# Patient Record
Sex: FEMALE | Race: WHITE | Employment: FULL TIME | URBAN - METROPOLITAN AREA
[De-identification: names, ages, dates, MRNs, and addresses within clinical notes are randomized per-mention and may not be internally consistent; named-entity substitution may affect disease eponyms.]

---

## 2020-12-31 ENCOUNTER — HOSPITAL ENCOUNTER (OUTPATIENT)
Dept: SURGERY | Age: 45
Discharge: HOME | End: 2020-12-31
Payer: COMMERCIAL

## 2020-12-31 ENCOUNTER — HOSPITAL ENCOUNTER (OUTPATIENT)
Dept: LAB | Age: 45
Discharge: HOME | End: 2020-12-31

## 2020-12-31 VITALS
OXYGEN SATURATION: 98 % | RESPIRATION RATE: 17 BRPM | TEMPERATURE: 98.1 F | DIASTOLIC BLOOD PRESSURE: 75 MMHG | HEART RATE: 72 BPM | WEIGHT: 136 LBS | SYSTOLIC BLOOD PRESSURE: 125 MMHG | BODY MASS INDEX: 20.14 KG/M2 | HEIGHT: 69 IN

## 2020-12-31 LAB
BACTERIA SPEC CULT: NORMAL
SERVICE CMNT-IMP: NORMAL

## 2020-12-31 PROCEDURE — 87641 MR-STAPH DNA AMP PROBE: CPT

## 2020-12-31 RX ORDER — ACETAMINOPHEN 325 MG/1
TABLET ORAL
COMMUNITY

## 2020-12-31 RX ORDER — NAPROXEN 375 MG/1
375 TABLET ORAL 2 TIMES DAILY WITH MEALS
COMMUNITY

## 2020-12-31 NOTE — PERIOP NOTES
Patient verified name, , and surgery as listed in [facility]. Patient provided medical/health information and PTA medications to the best of their ability. TYPE  CASE: 1B  Orders per surgeon: were not found during PAT  Labs per surgeon: MRSA. Labs per anesthesia protocol: none per protocol. EKG:  None per protocol    Surgery labeled Urgent, no covid test per hospital policy. Nasal Swab collected per MD order. Patient provided with and instructed on education handouts including Guide to Surgery, blood transfusions, pain management, and hand hygiene for the family and community, and [facility] brochure. Road to Recovery Spine surgery patient guide given. Instructed on incentive spirometry with return demonstration. Hibiclens and instructions given per hospital policy. Original medication prescription bottles were not visualized during patient appointment. Patient teach back successful and patient demonstrates knowledge of instruction.

## 2020-12-31 NOTE — PERIOP NOTES
PLEASE CONTINUE TAKING ALL PRESCRIPTION MEDICATIONS UP TO THE DAY OF SURGERY UNLESS OTHERWISE DIRECTED BELOW. DISCONTINUE all vitamins and supplements 5 days prior to surgery. DISCONTINUE Non-Steriodal Anti-Inflammatory (NSAIDS) such as Advil and Aleve 5 days prior to surgery. Home Medications to take  the day of surgery    Oxycodone-acetaminophen (Percoet)           Home Medications   to Hold   Naprosyn (Alleve) 5 days prior to surgery        Comments          *Visitor policy of 1 visitor per patient discussed. Please do not bring home medications with you on the day of surgery unless otherwise directed by your nurse. If you are instructed to bring home medications, please give them to your nurse as they will be administered by the nursing staff. If you have any questions, please call [address] [place] [address] [phone] or [facility] [phone]. A copy of this note was provided to the patient for reference.

## 2020-12-31 NOTE — PERIOP NOTES
Recent Results (from the past 12 hour(s))   MSSA/MRSA SC BY PCR, NASAL SWAB    Collection Time: 12/31/20 10:39 AM    Specimen: Nasal swab   Result Value Ref Range    Special Requests: NO SPECIAL REQUESTS      Culture result:        SA target not detected. A MRSA NEGATIVE, SA NEGATIVE test result does not preclude MRSA or SA nasal colonization. Labs routed to Dr. Bryce Guerra.

## 2021-01-03 ENCOUNTER — ANESTHESIA EVENT (OUTPATIENT)
Dept: SURGERY | Age: 46
End: 2021-01-03
Payer: OTHER MISCELLANEOUS

## 2021-01-04 ENCOUNTER — ANESTHESIA (OUTPATIENT)
Dept: SURGERY | Age: 46
End: 2021-01-04
Payer: OTHER MISCELLANEOUS

## 2021-01-04 ENCOUNTER — HOSPITAL ENCOUNTER (OUTPATIENT)
Age: 46
Setting detail: OUTPATIENT SURGERY
Discharge: HOME | End: 2021-01-04
Attending: NEUROLOGICAL SURGERY | Admitting: NEUROLOGICAL SURGERY
Payer: OTHER MISCELLANEOUS

## 2021-01-04 ENCOUNTER — APPOINTMENT (OUTPATIENT)
Dept: GENERAL RADIOLOGY | Age: 46
End: 2021-01-04
Attending: NEUROLOGICAL SURGERY
Payer: OTHER MISCELLANEOUS

## 2021-01-04 VITALS
HEIGHT: 69 IN | WEIGHT: 135 LBS | OXYGEN SATURATION: 100 % | SYSTOLIC BLOOD PRESSURE: 160 MMHG | TEMPERATURE: 98.4 F | DIASTOLIC BLOOD PRESSURE: 67 MMHG | BODY MASS INDEX: 19.99 KG/M2 | RESPIRATION RATE: 16 BRPM | HEART RATE: 73 BPM

## 2021-01-04 DIAGNOSIS — M48.062 SPINAL STENOSIS OF LUMBAR REGION WITH NEUROGENIC CLAUDICATION: Primary | ICD-10-CM

## 2021-01-04 PROCEDURE — 76210000021 HC REC RM PH II 0.5 TO 1 HR: Performed by: NEUROLOGICAL SURGERY

## 2021-01-04 PROCEDURE — 77030040922 HC BLNKT HYPOTHRM STRY -A: Performed by: STUDENT IN AN ORGANIZED HEALTH CARE EDUCATION/TRAINING PROGRAM

## 2021-01-04 PROCEDURE — 74011250637 HC RX REV CODE- 250/637: Performed by: STUDENT IN AN ORGANIZED HEALTH CARE EDUCATION/TRAINING PROGRAM

## 2021-01-04 PROCEDURE — 77030040106 HC FCPS BIPOLAR DISP INLC -D: Performed by: NEUROLOGICAL SURGERY

## 2021-01-04 PROCEDURE — 77030018390 HC SPNG HEMSTAT2 J&J -B: Performed by: NEUROLOGICAL SURGERY

## 2021-01-04 PROCEDURE — 74011000272 HC RX REV CODE- 272: Performed by: NEUROLOGICAL SURGERY

## 2021-01-04 PROCEDURE — 76010000161 HC OR TIME 1 TO 1.5 HR INTENSV-TIER 1: Performed by: NEUROLOGICAL SURGERY

## 2021-01-04 PROCEDURE — 88304 TISSUE EXAM BY PATHOLOGIST: CPT

## 2021-01-04 PROCEDURE — 77030034479 HC ADH SKN CLSR PRINEO J&J -B: Performed by: NEUROLOGICAL SURGERY

## 2021-01-04 PROCEDURE — 77030040361 HC SLV COMPR DVT MDII -B: Performed by: NEUROLOGICAL SURGERY

## 2021-01-04 PROCEDURE — 74011250636 HC RX REV CODE- 250/636: Performed by: NEUROLOGICAL SURGERY

## 2021-01-04 PROCEDURE — 74011000250 HC RX REV CODE- 250: Performed by: NEUROLOGICAL SURGERY

## 2021-01-04 PROCEDURE — 77030030163 HC BN WAX J&J -A: Performed by: NEUROLOGICAL SURGERY

## 2021-01-04 PROCEDURE — 77030019908 HC STETH ESOPH SIMS -A: Performed by: STUDENT IN AN ORGANIZED HEALTH CARE EDUCATION/TRAINING PROGRAM

## 2021-01-04 PROCEDURE — 72020 X-RAY EXAM OF SPINE 1 VIEW: CPT

## 2021-01-04 PROCEDURE — 74011250636 HC RX REV CODE- 250/636: Performed by: NURSE ANESTHETIST, CERTIFIED REGISTERED

## 2021-01-04 PROCEDURE — 74011000250 HC RX REV CODE- 250: Performed by: NURSE ANESTHETIST, CERTIFIED REGISTERED

## 2021-01-04 PROCEDURE — 77030012935 HC DRSG AQUACEL BMS -B: Performed by: NEUROLOGICAL SURGERY

## 2021-01-04 PROCEDURE — 74011250637 HC RX REV CODE- 250/637: Performed by: NEUROLOGICAL SURGERY

## 2021-01-04 PROCEDURE — 74011250636 HC RX REV CODE- 250/636: Performed by: STUDENT IN AN ORGANIZED HEALTH CARE EDUCATION/TRAINING PROGRAM

## 2021-01-04 PROCEDURE — 77030037088 HC TUBE ENDOTRACH ORAL NSL COVD-A: Performed by: STUDENT IN AN ORGANIZED HEALTH CARE EDUCATION/TRAINING PROGRAM

## 2021-01-04 PROCEDURE — 76210000063 HC OR PH I REC FIRST 0.5 HR: Performed by: NEUROLOGICAL SURGERY

## 2021-01-04 PROCEDURE — 77030039425 HC BLD LARYNG TRULITE DISP TELE -A: Performed by: STUDENT IN AN ORGANIZED HEALTH CARE EDUCATION/TRAINING PROGRAM

## 2021-01-04 PROCEDURE — 77030012894: Performed by: NEUROLOGICAL SURGERY

## 2021-01-04 PROCEDURE — 77030003029 HC SUT VCRL J&J -B: Performed by: NEUROLOGICAL SURGERY

## 2021-01-04 PROCEDURE — 76060000033 HC ANESTHESIA 1 TO 1.5 HR: Performed by: NEUROLOGICAL SURGERY

## 2021-01-04 PROCEDURE — 2709999900 HC NON-CHARGEABLE SUPPLY: Performed by: NEUROLOGICAL SURGERY

## 2021-01-04 RX ORDER — HYDROMORPHONE HYDROCHLORIDE 2 MG/ML
0.5 INJECTION, SOLUTION INTRAMUSCULAR; INTRAVENOUS; SUBCUTANEOUS
Status: DISCONTINUED | OUTPATIENT
Start: 2021-01-04 | End: 2021-01-04 | Stop reason: HOSPADM

## 2021-01-04 RX ORDER — FENTANYL CITRATE 50 UG/ML
INJECTION, SOLUTION INTRAMUSCULAR; INTRAVENOUS AS NEEDED
Status: DISCONTINUED | OUTPATIENT
Start: 2021-01-04 | End: 2021-01-04 | Stop reason: HOSPADM

## 2021-01-04 RX ORDER — BUPIVACAINE HYDROCHLORIDE AND EPINEPHRINE 5; 5 MG/ML; UG/ML
INJECTION, SOLUTION EPIDURAL; INTRACAUDAL; PERINEURAL AS NEEDED
Status: DISCONTINUED | OUTPATIENT
Start: 2021-01-04 | End: 2021-01-04 | Stop reason: HOSPADM

## 2021-01-04 RX ORDER — ESMOLOL HYDROCHLORIDE 10 MG/ML
INJECTION INTRAVENOUS AS NEEDED
Status: DISCONTINUED | OUTPATIENT
Start: 2021-01-04 | End: 2021-01-04 | Stop reason: HOSPADM

## 2021-01-04 RX ORDER — CEFAZOLIN SODIUM/WATER 2 G/20 ML
2 SYRINGE (ML) INTRAVENOUS ONCE
Status: DISCONTINUED | OUTPATIENT
Start: 2021-01-04 | End: 2021-01-04 | Stop reason: HOSPADM

## 2021-01-04 RX ORDER — DEXAMETHASONE SODIUM PHOSPHATE 4 MG/ML
INJECTION, SOLUTION INTRA-ARTICULAR; INTRALESIONAL; INTRAMUSCULAR; INTRAVENOUS; SOFT TISSUE AS NEEDED
Status: DISCONTINUED | OUTPATIENT
Start: 2021-01-04 | End: 2021-01-04 | Stop reason: HOSPADM

## 2021-01-04 RX ORDER — OXYCODONE HYDROCHLORIDE 5 MG/1
5 TABLET ORAL
Status: COMPLETED | OUTPATIENT
Start: 2021-01-04 | End: 2021-01-04

## 2021-01-04 RX ORDER — ROCURONIUM BROMIDE 10 MG/ML
INJECTION, SOLUTION INTRAVENOUS AS NEEDED
Status: DISCONTINUED | OUTPATIENT
Start: 2021-01-04 | End: 2021-01-04 | Stop reason: HOSPADM

## 2021-01-04 RX ORDER — MIDAZOLAM HYDROCHLORIDE 1 MG/ML
2 INJECTION, SOLUTION INTRAMUSCULAR; INTRAVENOUS
Status: DISCONTINUED | OUTPATIENT
Start: 2021-01-04 | End: 2021-01-04 | Stop reason: HOSPADM

## 2021-01-04 RX ORDER — SODIUM CHLORIDE, SODIUM LACTATE, POTASSIUM CHLORIDE, CALCIUM CHLORIDE 600; 310; 30; 20 MG/100ML; MG/100ML; MG/100ML; MG/100ML
100 INJECTION, SOLUTION INTRAVENOUS CONTINUOUS
Status: DISCONTINUED | OUTPATIENT
Start: 2021-01-04 | End: 2021-01-04 | Stop reason: HOSPADM

## 2021-01-04 RX ORDER — PROPOFOL 10 MG/ML
INJECTION, EMULSION INTRAVENOUS AS NEEDED
Status: DISCONTINUED | OUTPATIENT
Start: 2021-01-04 | End: 2021-01-04 | Stop reason: HOSPADM

## 2021-01-04 RX ORDER — NALOXONE HYDROCHLORIDE 0.4 MG/ML
0.1 INJECTION, SOLUTION INTRAMUSCULAR; INTRAVENOUS; SUBCUTANEOUS AS NEEDED
Status: DISCONTINUED | OUTPATIENT
Start: 2021-01-04 | End: 2021-01-04 | Stop reason: HOSPADM

## 2021-01-04 RX ORDER — SODIUM CHLORIDE 0.9 % (FLUSH) 0.9 %
5-40 SYRINGE (ML) INJECTION AS NEEDED
Status: DISCONTINUED | OUTPATIENT
Start: 2021-01-04 | End: 2021-01-04 | Stop reason: HOSPADM

## 2021-01-04 RX ORDER — APREPITANT 40 MG/1
40 CAPSULE ORAL ONCE
Status: COMPLETED | OUTPATIENT
Start: 2021-01-04 | End: 2021-01-04

## 2021-01-04 RX ORDER — GLYCOPYRROLATE 0.2 MG/ML
INJECTION INTRAMUSCULAR; INTRAVENOUS AS NEEDED
Status: DISCONTINUED | OUTPATIENT
Start: 2021-01-04 | End: 2021-01-04 | Stop reason: HOSPADM

## 2021-01-04 RX ORDER — SODIUM CHLORIDE 0.9 % (FLUSH) 0.9 %
5-40 SYRINGE (ML) INJECTION EVERY 8 HOURS
Status: DISCONTINUED | OUTPATIENT
Start: 2021-01-04 | End: 2021-01-04 | Stop reason: HOSPADM

## 2021-01-04 RX ORDER — DIPHENHYDRAMINE HYDROCHLORIDE 50 MG/ML
12.5 INJECTION, SOLUTION INTRAMUSCULAR; INTRAVENOUS
Status: DISCONTINUED | OUTPATIENT
Start: 2021-01-04 | End: 2021-01-04 | Stop reason: HOSPADM

## 2021-01-04 RX ORDER — FLUMAZENIL 0.1 MG/ML
0.2 INJECTION INTRAVENOUS
Status: DISCONTINUED | OUTPATIENT
Start: 2021-01-04 | End: 2021-01-04 | Stop reason: HOSPADM

## 2021-01-04 RX ORDER — ACETAMINOPHEN 500 MG
1000 TABLET ORAL ONCE
Status: COMPLETED | OUTPATIENT
Start: 2021-01-04 | End: 2021-01-04

## 2021-01-04 RX ORDER — LIDOCAINE HYDROCHLORIDE 20 MG/ML
INJECTION, SOLUTION EPIDURAL; INFILTRATION; INTRACAUDAL; PERINEURAL AS NEEDED
Status: DISCONTINUED | OUTPATIENT
Start: 2021-01-04 | End: 2021-01-04 | Stop reason: HOSPADM

## 2021-01-04 RX ORDER — OXYCODONE AND ACETAMINOPHEN 10; 325 MG/1; MG/1
1 TABLET ORAL
Qty: 28 TAB | Refills: 0 | Status: SHIPPED | OUTPATIENT
Start: 2021-01-04 | End: 2021-01-11

## 2021-01-04 RX ORDER — LIDOCAINE HYDROCHLORIDE 10 MG/ML
0.1 INJECTION INFILTRATION; PERINEURAL AS NEEDED
Status: DISCONTINUED | OUTPATIENT
Start: 2021-01-04 | End: 2021-01-04 | Stop reason: HOSPADM

## 2021-01-04 RX ORDER — ONDANSETRON 2 MG/ML
4 INJECTION INTRAMUSCULAR; INTRAVENOUS ONCE
Status: COMPLETED | OUTPATIENT
Start: 2021-01-04 | End: 2021-01-04

## 2021-01-04 RX ORDER — NEOSTIGMINE METHYLSULFATE 1 MG/ML
INJECTION, SOLUTION INTRAVENOUS AS NEEDED
Status: DISCONTINUED | OUTPATIENT
Start: 2021-01-04 | End: 2021-01-04 | Stop reason: HOSPADM

## 2021-01-04 RX ORDER — THROMBIN, TOPICAL (BOVINE) 20000 UNIT
KIT TOPICAL AS NEEDED
Status: DISCONTINUED | OUTPATIENT
Start: 2021-01-04 | End: 2021-01-04 | Stop reason: HOSPADM

## 2021-01-04 RX ADMIN — FENTANYL CITRATE 50 MCG: 50 INJECTION INTRAMUSCULAR; INTRAVENOUS at 10:24

## 2021-01-04 RX ADMIN — APREPITANT 40 MG: 40 CAPSULE ORAL at 08:40

## 2021-01-04 RX ADMIN — Medication 3 MG: at 11:26

## 2021-01-04 RX ADMIN — FENTANYL CITRATE 50 MCG: 50 INJECTION INTRAMUSCULAR; INTRAVENOUS at 11:36

## 2021-01-04 RX ADMIN — FENTANYL CITRATE 50 MCG: 50 INJECTION INTRAMUSCULAR; INTRAVENOUS at 10:27

## 2021-01-04 RX ADMIN — ESMOLOL HYDROCHLORIDE 30 MG: 10 INJECTION, SOLUTION INTRAVENOUS at 11:31

## 2021-01-04 RX ADMIN — GLYCOPYRROLATE 0.6 MG: 0.2 INJECTION, SOLUTION INTRAMUSCULAR; INTRAVENOUS at 11:26

## 2021-01-04 RX ADMIN — ACETAMINOPHEN 1000 MG: 500 TABLET ORAL at 08:40

## 2021-01-04 RX ADMIN — OXYCODONE 5 MG: 5 TABLET ORAL at 12:06

## 2021-01-04 RX ADMIN — ONDANSETRON 4 MG: 2 INJECTION INTRAMUSCULAR; INTRAVENOUS at 08:29

## 2021-01-04 RX ADMIN — ROCURONIUM BROMIDE 50 MG: 10 INJECTION, SOLUTION INTRAVENOUS at 10:27

## 2021-01-04 RX ADMIN — LIDOCAINE HYDROCHLORIDE 60 MG: 20 INJECTION, SOLUTION EPIDURAL; INFILTRATION; INTRACAUDAL; PERINEURAL at 10:27

## 2021-01-04 RX ADMIN — DEXAMETHASONE SODIUM PHOSPHATE 4 MG: 4 INJECTION, SOLUTION INTRAMUSCULAR; INTRAVENOUS at 10:44

## 2021-01-04 RX ADMIN — Medication 3 AMPULE: at 08:22

## 2021-01-04 RX ADMIN — SODIUM CHLORIDE, SODIUM LACTATE, POTASSIUM CHLORIDE, AND CALCIUM CHLORIDE: 600; 310; 30; 20 INJECTION, SOLUTION INTRAVENOUS at 11:41

## 2021-01-04 RX ADMIN — SODIUM CHLORIDE, SODIUM LACTATE, POTASSIUM CHLORIDE, AND CALCIUM CHLORIDE 100 ML/HR: 600; 310; 30; 20 INJECTION, SOLUTION INTRAVENOUS at 08:32

## 2021-01-04 RX ADMIN — HYDROMORPHONE HYDROCHLORIDE 0.5 MG: 2 INJECTION INTRAMUSCULAR; INTRAVENOUS; SUBCUTANEOUS at 12:06

## 2021-01-04 RX ADMIN — FENTANYL CITRATE 50 MCG: 50 INJECTION INTRAMUSCULAR; INTRAVENOUS at 11:41

## 2021-01-04 RX ADMIN — PROPOFOL 140 MG: 10 INJECTION, EMULSION INTRAVENOUS at 10:27

## 2021-01-04 NOTE — BRIEF OP NOTE
Brief Postoperative Note    Patient: Nelda Mendez  YOB: 1975  MRN: [id number]    Date of Procedure: 1/4/2021     Pre-Op Diagnosis: Lumbar stenosis with neurogenic claudication [M48.062]  Cauda equina syndrome ([address] [G83.4]  Herniated nucleus pulposus, lumbar [M51.26]    Post-Op Diagnosis: SAME      Procedure(s):  LEFT L5 S1 LAMINECTOMY  AND DISCECTOMY    Surgeon(s):  Philip Hodges MD    Surgical Assistant: None    Anesthesia: General     Estimated Blood Loss (mL): MINIMAL    Complications: NONE    Specimens:   ID Type Source Tests Collected by Time Destination   1 : L5- S1 disc material  Preservative Disc Material  Philip Hodges MD 1/4/2021 1105 Pathology        Implants: * No implants in log *    Drains: * No LDAs found *    Findings: STENOSIS+ HNP    Electronically Signed by Estephania Chapman MD on 1/4/2021 at 11:38 AM

## 2021-01-04 NOTE — PROGRESS NOTES
Spiritual Care visit. Initial Visit, Pre Surgery Consult. Visit and prayer before patient goes to surgery.     Visit by Mar Christianson M.Ed., Th.B. ,Staff

## 2021-01-04 NOTE — ANESTHESIA POSTPROCEDURE EVALUATION
Procedure(s):  LEFT L5 S1 LAMINECTOMY  AND DISCECTOMY. general    Anesthesia Post Evaluation      Multimodal analgesia: multimodal analgesia used between 6 hours prior to anesthesia start to PACU discharge  Patient location during evaluation: bedside  Patient participation: complete - patient participated  Level of consciousness: awake and alert  Pain management: adequate  Airway patency: patent  Anesthetic complications: no  Cardiovascular status: acceptable  Respiratory status: acceptable  Hydration status: acceptable  Post anesthesia nausea and vomiting:  controlled  Final Post Anesthesia Temperature Assessment:  Normothermia (36.0-37.5 degrees C)      INITIAL Post-op Vital signs:   Vitals Value Taken Time   /64 01/04/21 1206   Temp 36.7 °C (98 °F) 01/04/21 1144   Pulse 69 01/04/21 1208   Resp 15 01/04/21 1206   SpO2 100 % 01/04/21 1208   Vitals shown include unvalidated device data.

## 2021-01-04 NOTE — ANESTHESIA PREPROCEDURE EVALUATION
Relevant Problems   No relevant active problems       Anesthetic History   No history of anesthetic complications            Review of Systems / Medical History  Patient summary reviewed and pertinent labs reviewed    Pulmonary          Smoker         Neuro/Psych   Within defined limits           Cardiovascular  Within defined limits                Exercise tolerance: >4 METS     GI/Hepatic/Renal  Within defined limits              Endo/Other  Within defined limits           Other Findings              Physical Exam    Airway  Mallampati: II  TM Distance: 4 - 6 cm  Neck ROM: normal range of motion   Mouth opening: Normal     Cardiovascular  Regular rate and rhythm,  S1 and S2 normal,  no murmur, click, rub, or gallop             Dental  No notable dental hx       Pulmonary  Breath sounds clear to auscultation               Abdominal         Other Findings            Anesthetic Plan    ASA: 2  Anesthesia type: general          Induction: Intravenous  Anesthetic plan and risks discussed with: Patient and Spouse      Patient nauseous in holding. She reports constipation and nausea which has been a recurring problem. IV zofran in holding. If able to tolerate will give Valeen [place] as well.

## 2021-01-04 NOTE — OP NOTES
[facility]  OPERATIVE REPORT    Name:  Odette Homans  MR#:  [id number]  :  1975  ACCOUNT #:  [de-identified]  DATE OF SERVICE:  2021    PREOPERATIVE DIAGNOSIS:  Left S1 radiculopathy secondary to disk herniation and stenosis. POSTOPERATIVE DIAGNOSIS:  Left S1 radiculopathy secondary to disk herniation and stenosis. PROCEDURE PERFORMED:  Left L5-S1 laminectomy, facetectomy, foraminotomy and diskectomy. SURGEON:  Joelle White. Yudith Avalos MD    ASSISTANT:  None. ANESTHESIA:  General endotracheal.    COMPLICATIONS:  None. SPECIMENS REMOVED:  L5-S1 disk. IMPLANTS:  None. ESTIMATED BLOOD LOSS:  Minimal.    PREPARATION:  ChloraPrep. HISTORY OF PRESENT ILLNESS:  A 66-year-old lady status post work-related injury with persistent back and leg pain and impending cauda equina syndrome. MRI scanning was positive for severe spinal stenosis due to disk protrusion, L5-S1. The patient was admitted for urgent surgery. OPERATIVE NOTE:  The patient was brought to the operating room, was carefully placed under general endotracheal anesthesia without complications, carefully turned prone on the Cloward frame and the posterior aspect of the back was shaved and prepped in the usual sterile fashion. A midline incision was made overlying L5 and S1. Muscles were stripped in the left lateral subperiosteal plane with cautery and elevators and deep retractors were placed. Lateral lumbar spine x-ray confirmed the correct placement of instrument pointing at the L5-S1 disk space. Next, laminectomy and facetectomy were carried out with Leksell rongeurs and 3-mm Kerrison rongeurs. Significant bony and ligamentous hypertrophy were present. A plane was created between the ligamentum flavum and dura. The ligament was removed to expose the dural sac. Distal foraminotomy was widened to decompress a hyperemic and swollen left S1 nerve root.   It was carefully mobilized medially to expose a large extruded disk at the disk space. This was incised with a 15-blade and cleaned with pituitary rongeurs until clear. A large volume of pathological disk material was removed. After completion of diskectomy, a ball-tip probe was passed easily under the nerve root without further compression. The wound was irrigated until clear. Thrombin was placed. No further bleeding was encountered and the wound was closed. The fascia was closed tightly with interrupted 0 Vicryl. 0.5% Marcaine with epinephrine was used to infiltrate the muscle for postoperative analgesia. Subcutaneous tissues were closed with interrupted 3-0 Vicryl. Skin was closed with Prineo tape and Dermabond and sterile dressings were placed. The patient tolerated the procedure well, was turned supine, awakened, extubated and taken to PACU in stable condition. There were no obvious complications.       MD COOKIE [email or web address]  D:  01/04/2021 11:44  T:  01/04/2021 13:46  JOB #:  [id number]

## 2021-02-16 PROBLEM — G83.4 CAUDA EQUINA SYNDROME (HCC): Status: ACTIVE | Noted: 2021-02-16

## 2021-02-16 PROBLEM — M48.062 SPINAL STENOSIS OF LUMBAR REGION WITH NEUROGENIC CLAUDICATION: Status: ACTIVE | Noted: 2021-02-16

## 2021-02-16 PROBLEM — Z72.0 TOBACCO ABUSE: Status: ACTIVE | Noted: 2021-02-16

## 2021-02-16 PROBLEM — M54.16 LUMBAR RADICULOPATHY: Status: ACTIVE | Noted: 2021-02-16

## 2021-02-16 PROBLEM — M51.26 HNP (HERNIATED NUCLEUS PULPOSUS), LUMBAR: Status: ACTIVE | Noted: 2021-02-16

## 2022-01-21 ENCOUNTER — HOSPITAL ENCOUNTER (OUTPATIENT)
Dept: MRI IMAGING | Age: 47
Discharge: HOME | End: 2022-01-21
Attending: NEUROLOGICAL SURGERY
Payer: OTHER MISCELLANEOUS

## 2022-01-21 DIAGNOSIS — M51.26 HNP (HERNIATED NUCLEUS PULPOSUS), LUMBAR: ICD-10-CM

## 2022-01-21 DIAGNOSIS — M54.16 LUMBAR RADICULOPATHY: ICD-10-CM

## 2022-01-21 DIAGNOSIS — M48.062 SPINAL STENOSIS OF LUMBAR REGION WITH NEUROGENIC CLAUDICATION: ICD-10-CM

## 2022-01-21 PROCEDURE — 72148 MRI LUMBAR SPINE W/O DYE: CPT

## 2022-03-19 PROBLEM — M51.26 HNP (HERNIATED NUCLEUS PULPOSUS), LUMBAR: Status: ACTIVE | Noted: 2021-02-16

## 2022-03-19 PROBLEM — M48.062 SPINAL STENOSIS OF LUMBAR REGION WITH NEUROGENIC CLAUDICATION: Status: ACTIVE | Noted: 2021-02-16

## 2022-03-19 PROBLEM — G83.4 CAUDA EQUINA SYNDROME (HCC): Status: ACTIVE | Noted: 2021-02-16

## 2022-03-19 PROBLEM — Z72.0 TOBACCO ABUSE: Status: ACTIVE | Noted: 2021-02-16

## 2022-03-20 PROBLEM — M54.16 LUMBAR RADICULOPATHY: Status: ACTIVE | Noted: 2021-02-16

## 2024-02-08 ENCOUNTER — TELEPHONE (OUTPATIENT)
Dept: NEUROSURGERY | Age: 49
End: 2024-02-08

## 2024-02-08 ENCOUNTER — OFFICE VISIT (OUTPATIENT)
Dept: NEUROSURGERY | Age: 49
End: 2024-02-08

## 2024-02-08 ENCOUNTER — HOSPITAL ENCOUNTER (OUTPATIENT)
Dept: MRI IMAGING | Age: 49
Discharge: HOME | End: 2024-02-11
Attending: NEUROLOGICAL SURGERY

## 2024-02-08 VITALS
BODY MASS INDEX: 21.12 KG/M2 | HEIGHT: 69 IN | TEMPERATURE: 96.9 F | SYSTOLIC BLOOD PRESSURE: 141 MMHG | DIASTOLIC BLOOD PRESSURE: 82 MMHG | OXYGEN SATURATION: 96 % | HEART RATE: 128 BPM

## 2024-02-08 DIAGNOSIS — M54.16 LUMBAR RADICULOPATHY: Primary | ICD-10-CM

## 2024-02-08 DIAGNOSIS — M54.16 LUMBAR RADICULOPATHY: ICD-10-CM

## 2024-02-08 DIAGNOSIS — R29.898 BILATERAL LEG WEAKNESS: ICD-10-CM

## 2024-02-08 PROCEDURE — 99215 OFFICE O/P EST HI 40 MIN: CPT | Performed by: NEUROLOGICAL SURGERY

## 2024-02-08 RX ORDER — HYDROXYZINE PAMOATE 25 MG/1
25 CAPSULE ORAL 3 TIMES DAILY PRN
Qty: 30 CAPSULE | Refills: 0 | Status: SHIPPED | OUTPATIENT
Start: 2024-02-08 | End: 2024-02-22

## 2024-02-08 RX ORDER — OXYCODONE HYDROCHLORIDE AND ACETAMINOPHEN 5; 325 MG/1; MG/1
1 TABLET ORAL EVERY 8 HOURS PRN
Qty: 30 TABLET | Refills: 0 | Status: SHIPPED | OUTPATIENT
Start: 2024-02-08 | End: 2024-02-18

## 2024-02-08 RX ORDER — HYDROCODONE BITARTRATE AND ACETAMINOPHEN 10; 325 MG/1; MG/1
1 TABLET ORAL EVERY 6 HOURS PRN
COMMUNITY
End: 2024-02-08 | Stop reason: CLARIF

## 2024-02-08 NOTE — PROGRESS NOTES
Plan      ICD-10-CM    1. Lumbar radiculopathy  M54.16 MRI LUMBAR SPINE W WO CONTRAST      2. Bilateral leg weakness  R29.898 MRI LUMBAR SPINE W WO CONTRAST      Stat lumbar MRI rule out spinal stenosis/disc herniation.  Return visit after.      MARK ANTHONY NAQVI MD

## (undated) DEVICE — SYR LR LCK 1ML GRAD NSAF 30ML --

## (undated) DEVICE — WAX SURG 2.5GM HEMSTAT BNE BEESWAX PARAFFIN ISO PALMITATE

## (undated) DEVICE — SYR 10ML LUER LOK 1/5ML GRAD --

## (undated) DEVICE — 1010 S-DRAPE TOWEL DRAPE 10/BX: Brand: STERI-DRAPE™

## (undated) DEVICE — SYSTEM SKIN CLSR 22CM DERMBND PRINEO

## (undated) DEVICE — SPINE NEURO: Brand: MEDLINE INDUSTRIES, INC.

## (undated) DEVICE — PREP SKN CHLRAPRP APL 26ML STR --

## (undated) DEVICE — INTENDED FOR TISSUE SEPARATION, AND OTHER PROCEDURES THAT REQUIRE A SHARP SURGICAL BLADE TO PUNCTURE OR CUT.: Brand: BARD-PARKER SAFETY BLADES SIZE 15, STERILE

## (undated) DEVICE — 2000CC GUARDIAN II: Brand: GUARDIAN

## (undated) DEVICE — SOLUTION IV 1000ML 0.9% SOD CHL

## (undated) DEVICE — STANDARD HYPODERMIC NEEDLE,POLYPROPYLENE HUB: Brand: MONOJECT

## (undated) DEVICE — CATHETER IV 14GA L1.25IN PTFE ORNG FEP SFTY STR HUB RADPQ

## (undated) DEVICE — SUTURE VCRL + SZ 3-0 L18IN ABSRB UD SH 1/2 CIR TAPERCUT NDL VCP864D

## (undated) DEVICE — DRSG AQUACEL SURG 3.5X6IN -- CONVERT TO ITEM 369227

## (undated) DEVICE — GOWN,PREVENTION PLUS,2XL,ST,22/CS: Brand: MEDLINE

## (undated) DEVICE — CODMAN VERSATRU™ STANDARD DISPOSABLE NON-STICK BIPOLAR FORCEPS 9" (23CM), 1.5MM TIP: Brand: CODMAN VERSATRU™

## (undated) DEVICE — SUTURE VCRL VIO BR 0 18IN C/R M04 J701D

## (undated) DEVICE — 3M™ TEGADERM™ TRANSPARENT FILM DRESSING FRAME STYLE, 1628, 6 IN X 8 IN (15 CM X 20 CM), 10/CT 8CT/CASE: Brand: 3M™ TEGADERM™

## (undated) DEVICE — GARMENT,MEDLINE,DVT,INT,CALF,MED, GEN2: Brand: MEDLINE

## (undated) DEVICE — REM POLYHESIVE ADULT PATIENT RETURN ELECTRODE: Brand: VALLEYLAB

## (undated) DEVICE — POWDER HEMOSTAT GEL 1GR --

## (undated) DEVICE — DRAPE TWL SURG 16X26IN BLU ORB04] ALLCARE INC]

## (undated) DEVICE — GOWN,BREATHABLE SLV,AURORA,LG,STRL: Brand: MEDLINE

## (undated) DEVICE — AGENT HEMSTAT W3XL4IN OXIDIZED REGENERATED CELOS ABSRB FOR